# Patient Record
Sex: FEMALE | Employment: OTHER | URBAN - METROPOLITAN AREA
[De-identification: names, ages, dates, MRNs, and addresses within clinical notes are randomized per-mention and may not be internally consistent; named-entity substitution may affect disease eponyms.]

---

## 2019-12-30 ENCOUNTER — THERAPY VISIT (OUTPATIENT)
Dept: PHYSICAL THERAPY | Facility: CLINIC | Age: 69
End: 2019-12-30
Payer: COMMERCIAL

## 2019-12-30 DIAGNOSIS — M75.41 IMPINGEMENT SYNDROME OF SHOULDER REGION, RIGHT: ICD-10-CM

## 2019-12-30 DIAGNOSIS — M25.511 ACUTE PAIN OF RIGHT SHOULDER: ICD-10-CM

## 2019-12-30 PROCEDURE — 97110 THERAPEUTIC EXERCISES: CPT | Mod: GP | Performed by: PHYSICAL THERAPIST

## 2019-12-30 PROCEDURE — 97161 PT EVAL LOW COMPLEX 20 MIN: CPT | Mod: GP | Performed by: PHYSICAL THERAPIST

## 2019-12-30 NOTE — PROGRESS NOTES
[facility] Initial Evaluation  Subjective:  Pt reports that she has had pain on her R side that has slowly worsened over the last 3 months and she went to see Katherine Velasquez on 12-20-19 and she reports she is already a lot better.     The history is provided by the patient. No  was used.   Type of problem:  Right shoulder   Condition occurred with:  Unknown cause. This is a new condition    Patient reports pain:  Anterior, in the joint, lateral and upper trap. Radiates to:  Shoulder. Associated symptoms:  Loss of motion/stiffness and loss of strength. Symptoms are exacerbated by lying on extremity and relieved by activity/movement and ice.      and reported as 1/10 on pain scale. General health as reported by patient is excellent. Pertinent medical history includes:  High blood pressure.     Other surgery history details: inguinal hernia repair.  Current medications:  High blood pressure medication.     Pain is described as burning and aching and is intermittent. Pain is worse during the night. Since onset symptoms are rapidly improving.  Previous treatment includes physical therapy. There was moderate improvement following previous treatment.   Patient is retired.   Barriers include:  None as reported by patient.  Red flags:  None as reported by patient.                      Objective:  System                   Shoulder Evaluation:  ROM:  AROM:    Flexion:  Left:  152    Right:  155    Abduction:  Left: 165   Right:  162      External Rotation:  Left:  75    Right:  68            Extension/Internal Rotation:  Left:  T5    Right:  T4          Strength:  : R shoulder: 4/4/5/4 and L shouilder: 4+/5/5/4+                                                               General     ROS    Assessment/Plan:    Patient is a 69 year old female with right side shoulder complaints.    Patient has the following significant findings with corresponding treatment plan.                Diagnosis  1:  R shoulder impingement    Pain -  self management, education, directional preference exercise and home program  Decreased ROM/flexibility - manual therapy and therapeutic exercise  Decreased joint mobility - manual therapy and therapeutic exercise  Decreased strength - therapeutic exercise and therapeutic activities    Therapy Evaluation Codes:   1) Clinical presentation characteristics are:   Stable/Uncomplicated.  2) Decision-Making    Low complexity using standardized patient assessment instrument and/or measureable assessment of functional outcome.  Cumulative Therapy Evaluation is: Low complexity.    Previous and current functional limitations:  (See Goal Flow Sheet for this information)    Short term and Long term goals: (See Goal Flow Sheet for this information)     Communication ability:  Patient appears to be able to clearly communicate and understand verbal and written communication and follow directions correctly.  Treatment Explanation - The following has been discussed with the patient:   RX ordered/plan of care  Anticipated outcomes  Possible risks and side effects  This patient would benefit from PT intervention to resume normal activities.   Rehab potential is good.    Frequency:  1 X week, once daily every other week (3 visits)  Duration:  for 6 weeks  Discharge Plan:  Achieve all LTG.  Independent in home treatment program.  Reach maximal therapeutic benefit.    Please refer to the daily flowsheet for treatment today, total treatment time and time spent performing 1:1 timed codes.

## 2020-01-17 ENCOUNTER — THERAPY VISIT (OUTPATIENT)
Dept: PHYSICAL THERAPY | Facility: CLINIC | Age: 70
End: 2020-01-17
Payer: COMMERCIAL

## 2020-01-17 DIAGNOSIS — M25.511 ACUTE PAIN OF RIGHT SHOULDER: ICD-10-CM

## 2020-01-17 DIAGNOSIS — M75.41 IMPINGEMENT SYNDROME OF SHOULDER REGION, RIGHT: ICD-10-CM

## 2020-01-17 PROCEDURE — 97110 THERAPEUTIC EXERCISES: CPT | Mod: GP | Performed by: PHYSICAL THERAPIST

## 2020-01-17 PROCEDURE — 97112 NEUROMUSCULAR REEDUCATION: CPT | Mod: GP | Performed by: PHYSICAL THERAPIST

## 2020-01-17 NOTE — PROGRESS NOTES
Subjective:  HPI                    Objective:  System    Physical Exam    General     ROS    Assessment/Plan:    SUBJECTIVE  Subjective changes as noted by pt:  she is still feeling pretty good--she can lay on her R side and reach into the back seat for her purse without pain.       Current pain level: 0/10 at rest and 1/10 at end range ER    Changes in function:  Yes (See Goal flowsheet attached for changes in current functional level)     Adverse reaction to treatment or activity:  None    OBJECTIVE  Changes in objective findings:  Yes, Pt required significant manual, verbal and visual cuing to maintain proper scapular positioning during exercises today. NMR required for serratus, MT, and LT.          ASSESSMENT  Sylwia continues to require intervention to meet STG and LTG's: PT  Patient's symptoms are resolving.  Patient is progressing as expected.  Response to therapy has shown an improvement in  ROM , flexibility and strength  Progress made towards STG/LTG?  Yes (See Goal flowsheet attached for updates on achievement of STG and LTG)    PLAN  Current treatment program is being advanced to more complex exercises.    PTA/ATC plan:  N/A    Please refer to the daily flowsheet for treatment today, total treatment time and time spent performing 1:1 timed codes.

## 2020-03-20 PROBLEM — M25.511 ACUTE PAIN OF RIGHT SHOULDER: Status: RESOLVED | Noted: 2019-12-30 | Resolved: 2020-03-20

## 2020-03-20 PROBLEM — M75.41 IMPINGEMENT SYNDROME OF SHOULDER REGION, RIGHT: Status: RESOLVED | Noted: 2019-12-30 | Resolved: 2020-03-20

## 2020-03-20 NOTE — PROGRESS NOTES
Discharge Note    Progress reporting period is from initial evaluation date (please see noted date below) to Jan 17, 2020.  No linked episodes      Sylwia failed to follow up and current status is unknown.  Please see information below for last relevant information on current status.  Patient seen for   visits.    SUBJECTIVE  Subjective changes noted by patient:     .  Current pain level is  .     Previous pain level was   .   Changes in function:  Yes (See Goal flowsheet attached for changes in current functional level)  Adverse reaction to treatment or activity: None    OBJECTIVE  Changes noted in objective findings:       ASSESSMENT/PLAN  Diagnosis: R shoulder impingement   Updated problem list and treatment plan:   Pain - HEP  Decreased ROM/flexibility - HEP  Decreased function - HEP  Decreased strength - HEP  STG/LTGs have been met or progress has been made towards goals:  Yes, please see goal flowsheet for most current information  Assessment of Progress: current status is unknown.    Last current status:     Self Management Plans:  HEP  I have re-evaluated this patient and find that the nature, scope, duration and intensity of the therapy is appropriate for the medical condition of the patient.  Sylwia continues to require the following intervention to meet STG and LTG's:  HEP.    Recommendations:  Discharge with current home program.  Patient to follow up with MD as needed.    Please refer to the daily flowsheet for treatment today, total treatment time and time spent performing 1:1 timed codes.